# Patient Record
(demographics unavailable — no encounter records)

---

## 2025-02-18 NOTE — ASSESSMENT
[FreeTextEntry1] : # Acne vulgaris, mild, chronic, flaring - Discussed nature and course along with goals/expectations of therapy - increase fron every other day to nightly OTC diferin gel.  May start every three nights and increase to nightly as tolerated. SED including dryness, irritation, sun sensitivity - once she is tolerating the nightly diferin, can transition to tret 0.025% nightly to AA as tolerated. if too drying, switch back to diferin.   - c/w clinda lotion to AA daily.   # Seborrheic Dermatitis, chronic, flaring, face and scalp - Discussed nature of chronic condition, treatment expectations, alternatives, risks and benefits  - Start Ketoconazole 2% shampoo, use in place of regular shampoo with each washing, use at least twice a week. Leave on for 5-10 min then rinse. SED.   - keto cream to AA on face BID PRN flares. SED.   RTC 4-6 months or PRN

## 2025-02-18 NOTE — PHYSICAL EXAM
[Alert] : alert [Oriented x 3] : ~L oriented x 3 [Well Nourished] : well nourished [Declined] : declined [FreeTextEntry3] : Few scattered comedones and inflamed acne papules on cheeks and forehead erythema and greasy scale around nasal ala  scalp with minimal loose scale, mostly clear today

## 2025-02-18 NOTE — HISTORY OF PRESENT ILLNESS
[FreeTextEntry1] : rpa; acne [de-identified] : TIGIST LE is a 14 year old female who presents for the below.    acne follow up. using diferin every other night for the last few months. last week started using clinda lotion. still with active acne. notes dry red flakey areas around the nose. also with dandruff on/off. no tx tried. has a sibling with seb derm.    Allergies: NKDA Sun protection: sometimes; SPF30 daily Derm Hx: No personal skin cancer/skin conditions. Family Hx: No family history of skin cancer/skin conditions  SH: accompanied by mom and siblings

## 2025-02-18 NOTE — HISTORY OF PRESENT ILLNESS
[FreeTextEntry1] : rpa; acne [de-identified] : TIGIST LE is a 14 year old female who presents for the below.    acne follow up. using diferin every other night for the last few months. last week started using clinda lotion. still with active acne. notes dry red flakey areas around the nose. also with dandruff on/off. no tx tried. has a sibling with seb derm.    Allergies: NKDA Sun protection: sometimes; SPF30 daily Derm Hx: No personal skin cancer/skin conditions. Family Hx: No family history of skin cancer/skin conditions  SH: accompanied by mom and siblings